# Patient Record
Sex: FEMALE | URBAN - METROPOLITAN AREA
[De-identification: names, ages, dates, MRNs, and addresses within clinical notes are randomized per-mention and may not be internally consistent; named-entity substitution may affect disease eponyms.]

---

## 2023-01-05 ENCOUNTER — OFFICE VISIT (OUTPATIENT)
Dept: URBAN - METROPOLITAN AREA CLINIC 1 | Facility: CLINIC | Age: 88
End: 2023-01-05
Payer: MEDICARE

## 2023-01-05 DIAGNOSIS — H15.102 EPISCLERITIS OF LEFT EYE: Primary | ICD-10-CM

## 2023-01-05 DIAGNOSIS — Z96.1 PRESENCE OF INTRAOCULAR LENS: ICD-10-CM

## 2023-01-05 PROCEDURE — 99203 OFFICE O/P NEW LOW 30 MIN: CPT | Performed by: OPHTHALMOLOGY

## 2023-01-05 RX ORDER — NEOMYCIN SULFATE, POLYMYXIN B SULFATE AND DEXAMETHASONE 3.5; 10000; 1 MG/ML; [USP'U]/ML; MG/ML
SUSPENSION/ DROPS OPHTHALMIC
Qty: 5 | Refills: 0 | Status: ACTIVE
Start: 2023-01-05

## 2023-01-05 NOTE — IMPRESSION/PLAN
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 Plan: Discussed diagnosis in detail with patient. Discussed treatment options with patient. Reassured patient of current condition and treatment. Will continue to observe condition and or symptoms. New medication(s) Rx given today.   Dionisio/poly/dex gtt qid

## 2023-01-12 ENCOUNTER — OFFICE VISIT (OUTPATIENT)
Dept: URBAN - METROPOLITAN AREA CLINIC 1 | Facility: CLINIC | Age: 88
End: 2023-01-12
Payer: MEDICARE

## 2023-01-12 DIAGNOSIS — B00.82 HERPES SIMPLEX MYELITIS: Primary | ICD-10-CM

## 2023-01-12 PROCEDURE — 99213 OFFICE O/P EST LOW 20 MIN: CPT | Performed by: OPHTHALMOLOGY

## 2023-01-12 RX ORDER — NEOMYCIN SULFATE, POLYMYXIN B SULFATE AND DEXAMETHASONE 3.5; 10000; 1 MG/ML; [USP'U]/ML; MG/ML
SUSPENSION/ DROPS OPHTHALMIC
Qty: 5 | Refills: 0 | Status: INACTIVE
Start: 2023-01-12 | End: 2023-01-12

## 2023-01-12 RX ORDER — VALACYCLOVIR 1 G/1
TABLET, FILM COATED ORAL
Qty: 42 | Refills: 0 | Status: INACTIVE
Start: 2023-01-12 | End: 2023-01-25

## 2023-01-12 RX ORDER — NEOMYCIN SULFATE, POLYMYXIN B SULFATE AND DEXAMETHASONE 3.5; 10000; 1 MG/ML; [USP'U]/ML; MG/ML
SUSPENSION/ DROPS OPHTHALMIC
Qty: 5 | Refills: 0 | Status: ACTIVE
Start: 2023-01-12

## 2023-01-12 ASSESSMENT — INTRAOCULAR PRESSURE
OD: 18
OS: 12

## 2023-01-12 NOTE — IMPRESSION/PLAN
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 Plan: Pt condition has not improved will start patient on valacyclovir 1gr TID for 14 days. Pt will continue Neopolydex BID OU explained to patient only one drop in each eye. At's QID OU.

## 2023-02-02 ENCOUNTER — OFFICE VISIT (OUTPATIENT)
Dept: URBAN - METROPOLITAN AREA CLINIC 1 | Facility: CLINIC | Age: 88
End: 2023-02-02
Payer: MEDICARE

## 2023-02-02 DIAGNOSIS — H15.102 EPISCLERITIS OF LEFT EYE: ICD-10-CM

## 2023-02-02 DIAGNOSIS — Z96.1 PRESENCE OF INTRAOCULAR LENS: Primary | ICD-10-CM

## 2023-02-02 PROCEDURE — 99212 OFFICE O/P EST SF 10 MIN: CPT | Performed by: OPHTHALMOLOGY

## 2023-02-02 ASSESSMENT — INTRAOCULAR PRESSURE
OS: 17
OD: 17

## 2023-02-02 NOTE — IMPRESSION/PLAN
Impression: Presence of intraocular lens: Z96.1. Plan: Pseudophakia with lens in place. We recommended pt with specialist for low vision. Dr Rose Horner.

## 2023-02-22 ENCOUNTER — OFFICE VISIT (OUTPATIENT)
Dept: URBAN - METROPOLITAN AREA CLINIC 1 | Facility: CLINIC | Age: 88
End: 2023-02-22
Payer: MEDICARE

## 2023-02-22 DIAGNOSIS — H10.45 OTHER CHRONIC ALLERGIC CONJUNCTIVITIS: Primary | ICD-10-CM

## 2023-02-22 PROCEDURE — 99212 OFFICE O/P EST SF 10 MIN: CPT | Performed by: OPHTHALMOLOGY

## 2023-02-22 RX ORDER — PREDNISOLONE ACETATE 10 MG/ML
1 % SUSPENSION/ DROPS OPHTHALMIC
Qty: 5 | Refills: 0 | Status: ACTIVE
Start: 2023-02-22

## 2023-02-22 ASSESSMENT — INTRAOCULAR PRESSURE
OD: 17
OS: 14

## 2023-02-22 NOTE — IMPRESSION/PLAN
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 Plan: Patient states she has is having itching and burning OS. She states yesterday OS got red and today it is worse. Patient states she has lots of tearing and discharge. Patient states she is having pain this morning. 
Patient has a lot of inflammation we will start her in PRed1% qid os for 1 week and re evaluate possible antibiotic

## 2023-05-02 ENCOUNTER — OFFICE VISIT (OUTPATIENT)
Dept: URBAN - METROPOLITAN AREA CLINIC 1 | Facility: CLINIC | Age: 88
End: 2023-05-02
Payer: MEDICARE

## 2023-05-02 DIAGNOSIS — H52.223 REGULAR ASTIGMATISM, BILATERAL: ICD-10-CM

## 2023-05-02 DIAGNOSIS — H04.123 DRY EYE SYNDROME OF BILATERAL LACRIMAL GLANDS: Primary | ICD-10-CM

## 2023-05-02 PROCEDURE — 99213 OFFICE O/P EST LOW 20 MIN: CPT | Performed by: OPHTHALMOLOGY

## 2023-05-02 ASSESSMENT — INTRAOCULAR PRESSURE
OD: 15
OS: 17

## 2023-05-02 NOTE — IMPRESSION/PLAN
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 Plan: Dry eyes account for the patient's complaints. There is no evidence of permanent changes to the cornea. Explained that this condition does not have a cure. The options explained to the patient include topical lubricants. Patient advised not to use eye drops for red eye or allergies. Patient to use OTC AT's QID OU and she will use Pred BID OU until bottle is done.

## 2023-05-02 NOTE — IMPRESSION/PLAN
Impression: Regular astigmatism, bilateral: H52.223. Plan: Prescription for glasses given today. Patient educated. 

rv 6mos